# Patient Record
Sex: FEMALE | Race: BLACK OR AFRICAN AMERICAN | NOT HISPANIC OR LATINO
[De-identification: names, ages, dates, MRNs, and addresses within clinical notes are randomized per-mention and may not be internally consistent; named-entity substitution may affect disease eponyms.]

---

## 2018-09-24 ENCOUNTER — TRANSCRIPTION ENCOUNTER (OUTPATIENT)
Age: 33
End: 2018-09-24

## 2022-03-31 ENCOUNTER — NON-APPOINTMENT (OUTPATIENT)
Age: 37
End: 2022-03-31

## 2022-03-31 DIAGNOSIS — Z80.9 FAMILY HISTORY OF MALIGNANT NEOPLASM, UNSPECIFIED: ICD-10-CM

## 2022-03-31 DIAGNOSIS — Z98.890 OTHER SPECIFIED POSTPROCEDURAL STATES: ICD-10-CM

## 2022-03-31 DIAGNOSIS — Z83.3 FAMILY HISTORY OF DIABETES MELLITUS: ICD-10-CM

## 2022-03-31 DIAGNOSIS — Z87.59 PERSONAL HISTORY OF OTHER COMPLICATIONS OF PREGNANCY, CHILDBIRTH AND THE PUERPERIUM: ICD-10-CM

## 2022-03-31 DIAGNOSIS — Z78.9 OTHER SPECIFIED HEALTH STATUS: ICD-10-CM

## 2022-03-31 RX ORDER — VITAMIN A ACETATE, ASCORBIC ACID, CHOLECALCIFEROL, ALPHA-TOCOPHEROL ACETATE, DL, THIAMINE MONONITRATE, RIBOFLAVIN, NIACINAMIDE, PYRIDOXINE HYDROCHLORIDE, FOLIC ACID, CYANOCOBALAMIN, BIOTIN, CALCIUM PANTOTHENATE, CALCIUM CARBONATE, FERROUS FUMARATE, POTASSIUM IODIDE, MAGNESIUM OXIDE, ZINC OXIDE AND CUPRIC OXIDE 2500; 80; 400; 10; 3; 3.4; 20; 20; 1; 12; 300; 6; 120; 27; 150; 30; 15; 2 [IU]/1; MG/1; [IU]/1; [IU]/1; MG/1; MG/1; MG/1; MG/1; MG/1; UG/1; UG/1; MG/1; MG/1; MG/1; UG/1; UG/1; UG/1; MG/1
TABLET, FILM COATED ORAL
Refills: 0 | Status: ACTIVE | COMMUNITY

## 2022-04-05 ENCOUNTER — APPOINTMENT (OUTPATIENT)
Dept: ANTEPARTUM | Facility: CLINIC | Age: 37
End: 2022-04-05
Payer: COMMERCIAL

## 2022-04-05 ENCOUNTER — ASOB RESULT (OUTPATIENT)
Age: 37
End: 2022-04-05

## 2022-04-05 ENCOUNTER — NON-APPOINTMENT (OUTPATIENT)
Age: 37
End: 2022-04-05

## 2022-04-05 PROCEDURE — 76815 OB US LIMITED FETUS(S): CPT

## 2022-04-14 ENCOUNTER — APPOINTMENT (OUTPATIENT)
Dept: OBGYN | Facility: CLINIC | Age: 37
End: 2022-04-14
Payer: COMMERCIAL

## 2022-04-14 VITALS
DIASTOLIC BLOOD PRESSURE: 75 MMHG | BODY MASS INDEX: 30.12 KG/M2 | WEIGHT: 170 LBS | HEART RATE: 100 BPM | SYSTOLIC BLOOD PRESSURE: 127 MMHG | HEIGHT: 63 IN

## 2022-04-14 LAB
BILIRUB UR QL STRIP: NORMAL
GLUCOSE UR-MCNC: NORMAL
HCG UR QL: 0.2 EU/DL
HGB UR QL STRIP.AUTO: NORMAL
KETONES UR-MCNC: NORMAL
LEUKOCYTE ESTERASE UR QL STRIP: NORMAL
NITRITE UR QL STRIP: NORMAL
PH UR STRIP: 6
PROT UR STRIP-MCNC: NORMAL
SP GR UR STRIP: 1

## 2022-04-14 PROCEDURE — 0502F SUBSEQUENT PRENATAL CARE: CPT

## 2022-04-19 ENCOUNTER — APPOINTMENT (OUTPATIENT)
Dept: OBGYN | Facility: CLINIC | Age: 37
End: 2022-04-19

## 2022-05-06 ENCOUNTER — ASOB RESULT (OUTPATIENT)
Age: 37
End: 2022-05-06

## 2022-05-06 ENCOUNTER — APPOINTMENT (OUTPATIENT)
Dept: ANTEPARTUM | Facility: CLINIC | Age: 37
End: 2022-05-06
Payer: COMMERCIAL

## 2022-05-06 PROCEDURE — 76811 OB US DETAILED SNGL FETUS: CPT

## 2022-05-06 PROCEDURE — 76817 TRANSVAGINAL US OBSTETRIC: CPT

## 2022-05-11 ENCOUNTER — APPOINTMENT (OUTPATIENT)
Dept: OBGYN | Facility: CLINIC | Age: 37
End: 2022-05-11
Payer: COMMERCIAL

## 2022-05-11 PROCEDURE — 0502F SUBSEQUENT PRENATAL CARE: CPT

## 2022-05-12 LAB
BILIRUB UR QL STRIP: NORMAL
CLARITY UR: NORMAL
COLLECTION METHOD: NORMAL
GLUCOSE UR-MCNC: NORMAL
HCG UR QL: 0.2 EU/DL
HGB UR QL STRIP.AUTO: NORMAL
KETONES UR-MCNC: NORMAL
LEUKOCYTE ESTERASE UR QL STRIP: NORMAL
NITRITE UR QL STRIP: NORMAL
PH UR STRIP: 5.5
PROT UR STRIP-MCNC: NORMAL
SP GR UR STRIP: 1.01

## 2022-05-13 ENCOUNTER — APPOINTMENT (OUTPATIENT)
Dept: ANTEPARTUM | Facility: CLINIC | Age: 37
End: 2022-05-13
Payer: COMMERCIAL

## 2022-05-13 ENCOUNTER — ASOB RESULT (OUTPATIENT)
Age: 37
End: 2022-05-13

## 2022-05-13 ENCOUNTER — TRANSCRIPTION ENCOUNTER (OUTPATIENT)
Age: 37
End: 2022-05-13

## 2022-05-13 ENCOUNTER — APPOINTMENT (OUTPATIENT)
Dept: MATERNAL FETAL MEDICINE | Facility: CLINIC | Age: 37
End: 2022-05-13
Payer: COMMERCIAL

## 2022-05-13 PROCEDURE — 36415 COLL VENOUS BLD VENIPUNCTURE: CPT

## 2022-05-13 PROCEDURE — 99202 OFFICE O/P NEW SF 15 MIN: CPT | Mod: 95

## 2022-05-17 LAB
2ND TRIMESTER DATA: NORMAL
AFP PNL SERPL: NORMAL
AFP SERPL-ACNC: NORMAL
B-HCG FREE SERPL-MCNC: NORMAL
CLINICAL BIOCHEMIST REVIEW: NORMAL
INHIBIN A SERPL-MCNC: NORMAL
NOTES NTD: NORMAL
U ESTRIOL SERPL-SCNC: NORMAL

## 2022-05-18 ENCOUNTER — NON-APPOINTMENT (OUTPATIENT)
Age: 37
End: 2022-05-18

## 2022-05-19 ENCOUNTER — NON-APPOINTMENT (OUTPATIENT)
Age: 37
End: 2022-05-19

## 2022-06-07 ENCOUNTER — NON-APPOINTMENT (OUTPATIENT)
Age: 37
End: 2022-06-07

## 2022-06-07 ENCOUNTER — APPOINTMENT (OUTPATIENT)
Dept: OBGYN | Facility: CLINIC | Age: 37
End: 2022-06-07
Payer: COMMERCIAL

## 2022-06-07 VITALS
HEART RATE: 74 BPM | DIASTOLIC BLOOD PRESSURE: 67 MMHG | WEIGHT: 180 LBS | BODY MASS INDEX: 31.89 KG/M2 | HEIGHT: 63 IN | SYSTOLIC BLOOD PRESSURE: 115 MMHG

## 2022-06-07 VITALS
BODY MASS INDEX: 31.89 KG/M2 | SYSTOLIC BLOOD PRESSURE: 115 MMHG | DIASTOLIC BLOOD PRESSURE: 67 MMHG | WEIGHT: 180 LBS | HEART RATE: 74 BPM

## 2022-06-07 DIAGNOSIS — Z00.00 ENCOUNTER FOR GENERAL ADULT MEDICAL EXAMINATION W/OUT ABNORMAL FINDINGS: ICD-10-CM

## 2022-06-07 LAB
BILIRUB UR QL STRIP: NORMAL
GLUCOSE UR-MCNC: NORMAL
HCG UR QL: 0.2 EU/DL
HGB UR QL STRIP.AUTO: NORMAL
KETONES UR-MCNC: NORMAL
LEUKOCYTE ESTERASE UR QL STRIP: NORMAL
NITRITE UR QL STRIP: NORMAL
PH UR STRIP: 5.5
PROT UR STRIP-MCNC: NORMAL
SP GR UR STRIP: 1

## 2022-06-07 PROCEDURE — 0502F SUBSEQUENT PRENATAL CARE: CPT

## 2022-06-15 LAB
BASOPHILS # BLD AUTO: 0.01 K/UL
BASOPHILS NFR BLD AUTO: 0.1 %
EOSINOPHIL # BLD AUTO: 0 K/UL
EOSINOPHIL NFR BLD AUTO: 0 %
GLUCOSE 1H P 50 G GLC PO SERPL-MCNC: 122 MG/DL
HCT VFR BLD CALC: 34.6 %
HGB BLD-MCNC: 10.9 G/DL
IMM GRANULOCYTES NFR BLD AUTO: 0.7 %
LYMPHOCYTES # BLD AUTO: 1.24 K/UL
LYMPHOCYTES NFR BLD AUTO: 18.1 %
MAN DIFF?: NORMAL
MCHC RBC-ENTMCNC: 29 PG
MCHC RBC-ENTMCNC: 31.5 GM/DL
MCV RBC AUTO: 92 FL
MONOCYTES # BLD AUTO: 0.29 K/UL
MONOCYTES NFR BLD AUTO: 4.2 %
NEUTROPHILS # BLD AUTO: 5.25 K/UL
NEUTROPHILS NFR BLD AUTO: 76.9 %
PLATELET # BLD AUTO: 168 K/UL
RBC # BLD: 3.76 M/UL
RBC # FLD: 12.9 %
WBC # FLD AUTO: 6.84 K/UL

## 2022-06-16 ENCOUNTER — NON-APPOINTMENT (OUTPATIENT)
Age: 37
End: 2022-06-16

## 2022-06-28 ENCOUNTER — APPOINTMENT (OUTPATIENT)
Dept: OBGYN | Facility: CLINIC | Age: 37
End: 2022-06-28
Payer: COMMERCIAL

## 2022-06-28 VITALS
SYSTOLIC BLOOD PRESSURE: 118 MMHG | BODY MASS INDEX: 32.77 KG/M2 | DIASTOLIC BLOOD PRESSURE: 72 MMHG | WEIGHT: 185 LBS | HEART RATE: 94 BPM

## 2022-06-28 LAB
BILIRUB UR QL STRIP: NORMAL
GLUCOSE UR-MCNC: NORMAL
HCG UR QL: 0.2 EU/DL
HGB UR QL STRIP.AUTO: NORMAL
KETONES UR-MCNC: NORMAL
LEUKOCYTE ESTERASE UR QL STRIP: NORMAL
NITRITE UR QL STRIP: NORMAL
PH UR STRIP: 7
PROT UR STRIP-MCNC: NORMAL
SP GR UR STRIP: 1.01

## 2022-06-28 PROCEDURE — 0502F SUBSEQUENT PRENATAL CARE: CPT

## 2022-06-30 ENCOUNTER — APPOINTMENT (OUTPATIENT)
Dept: ANTEPARTUM | Facility: CLINIC | Age: 37
End: 2022-06-30

## 2022-06-30 ENCOUNTER — ASOB RESULT (OUTPATIENT)
Age: 37
End: 2022-06-30

## 2022-06-30 PROCEDURE — 76816 OB US FOLLOW-UP PER FETUS: CPT

## 2022-07-15 ENCOUNTER — NON-APPOINTMENT (OUTPATIENT)
Age: 37
End: 2022-07-15

## 2022-07-19 ENCOUNTER — APPOINTMENT (OUTPATIENT)
Dept: OBGYN | Facility: CLINIC | Age: 37
End: 2022-07-19

## 2022-07-19 VITALS
DIASTOLIC BLOOD PRESSURE: 70 MMHG | WEIGHT: 187 LBS | SYSTOLIC BLOOD PRESSURE: 118 MMHG | BODY MASS INDEX: 33.13 KG/M2 | HEART RATE: 83 BPM

## 2022-07-19 PROCEDURE — 0502F SUBSEQUENT PRENATAL CARE: CPT

## 2022-07-20 LAB
APPEARANCE: CLEAR
BILIRUBIN URINE: NEGATIVE
BLOOD URINE: NEGATIVE
COLOR: COLORLESS
GLUCOSE QUALITATIVE U: NEGATIVE
KETONES URINE: NEGATIVE
LEUKOCYTE ESTERASE URINE: NEGATIVE
NITRITE URINE: NEGATIVE
PH URINE: 7
PROTEIN URINE: NEGATIVE
SPECIFIC GRAVITY URINE: 1.01
UROBILINOGEN URINE: NORMAL

## 2022-07-21 LAB — BACTERIA UR CULT: NORMAL

## 2022-07-28 ENCOUNTER — ASOB RESULT (OUTPATIENT)
Age: 37
End: 2022-07-28

## 2022-07-28 ENCOUNTER — APPOINTMENT (OUTPATIENT)
Dept: ANTEPARTUM | Facility: CLINIC | Age: 37
End: 2022-07-28

## 2022-07-28 PROCEDURE — 76816 OB US FOLLOW-UP PER FETUS: CPT

## 2022-07-28 PROCEDURE — 76820 UMBILICAL ARTERY ECHO: CPT

## 2022-07-28 PROCEDURE — ZZZZZ: CPT

## 2022-07-28 PROCEDURE — 76818 FETAL BIOPHYS PROFILE W/NST: CPT

## 2022-08-02 ENCOUNTER — APPOINTMENT (OUTPATIENT)
Dept: OBGYN | Facility: CLINIC | Age: 37
End: 2022-08-02

## 2022-08-02 VITALS
WEIGHT: 191 LBS | DIASTOLIC BLOOD PRESSURE: 71 MMHG | SYSTOLIC BLOOD PRESSURE: 118 MMHG | HEART RATE: 92 BPM | BODY MASS INDEX: 33.83 KG/M2

## 2022-08-02 PROCEDURE — 0502F SUBSEQUENT PRENATAL CARE: CPT

## 2022-08-02 PROCEDURE — 59426 ANTEPARTUM CARE ONLY: CPT

## 2022-08-08 ENCOUNTER — TRANSCRIPTION ENCOUNTER (OUTPATIENT)
Age: 37
End: 2022-08-08

## 2022-08-09 ENCOUNTER — INPATIENT (INPATIENT)
Facility: HOSPITAL | Age: 37
LOS: 3 days | Discharge: HOME | End: 2022-08-13
Attending: OBSTETRICS & GYNECOLOGY | Admitting: OBSTETRICS & GYNECOLOGY
Payer: COMMERCIAL

## 2022-08-09 ENCOUNTER — NON-APPOINTMENT (OUTPATIENT)
Age: 37
End: 2022-08-09

## 2022-08-09 ENCOUNTER — RESULT REVIEW (OUTPATIENT)
Age: 37
End: 2022-08-09

## 2022-08-09 VITALS
SYSTOLIC BLOOD PRESSURE: 115 MMHG | TEMPERATURE: 99 F | RESPIRATION RATE: 17 BRPM | HEART RATE: 102 BPM | DIASTOLIC BLOOD PRESSURE: 70 MMHG

## 2022-08-09 DIAGNOSIS — O42.919 PRETERM PREMATURE RUPTURE OF MEMBRANES, UNSPECIFIED AS TO LENGTH OF TIME BETWEEN RUPTURE AND ONSET OF LABOR, UNSPECIFIED TRIMESTER: ICD-10-CM

## 2022-08-09 DIAGNOSIS — Z29.9 ENCOUNTER FOR PROPHYLACTIC MEASURES, UNSPECIFIED: ICD-10-CM

## 2022-08-09 DIAGNOSIS — O47.03 FALSE LABOR BEFORE 37 COMPLETED WEEKS OF GESTATION, THIRD TRIMESTER: ICD-10-CM

## 2022-08-09 DIAGNOSIS — O34.219 MATERNAL CARE FOR UNSPECIFIED TYPE SCAR FROM PREVIOUS CESAREAN DELIVERY: ICD-10-CM

## 2022-08-09 DIAGNOSIS — Z3A.34 34 WEEKS GESTATION OF PREGNANCY: ICD-10-CM

## 2022-08-09 DIAGNOSIS — O41.03X1 OLIGOHYDRAMNIOS, THIRD TRIMESTER, FETUS 1: ICD-10-CM

## 2022-08-09 LAB
ABO RH CONFIRMATION: SIGNIFICANT CHANGE UP
BASOPHILS # BLD AUTO: 0.01 K/UL — SIGNIFICANT CHANGE UP (ref 0–0.2)
BASOPHILS NFR BLD AUTO: 0.1 % — SIGNIFICANT CHANGE UP (ref 0–2)
BLD GP AB SCN SERPL QL: SIGNIFICANT CHANGE UP
COVID-19 SPIKE DOMAIN AB INTERP: POSITIVE
COVID-19 SPIKE DOMAIN ANTIBODY RESULT: 161 U/ML — HIGH
EOSINOPHIL # BLD AUTO: 0.01 K/UL — SIGNIFICANT CHANGE UP (ref 0–0.5)
EOSINOPHIL NFR BLD AUTO: 0.1 % — SIGNIFICANT CHANGE UP (ref 0–6)
HCT VFR BLD CALC: 33.4 % — LOW (ref 34.5–45)
HGB BLD-MCNC: 11.2 G/DL — LOW (ref 11.5–15.5)
HIV 1 & 2 AB SERPL IA.RAPID: SIGNIFICANT CHANGE UP
HIV 1+2 AB+HIV1 P24 AG SERPL QL IA: SIGNIFICANT CHANGE UP
IMM GRANULOCYTES NFR BLD AUTO: 1 % — SIGNIFICANT CHANGE UP (ref 0–1.5)
LYMPHOCYTES # BLD AUTO: 1.22 K/UL — SIGNIFICANT CHANGE UP (ref 1–3.3)
LYMPHOCYTES # BLD AUTO: 17 % — SIGNIFICANT CHANGE UP (ref 13–44)
MCHC RBC-ENTMCNC: 28.6 PG — SIGNIFICANT CHANGE UP (ref 27–34)
MCHC RBC-ENTMCNC: 33.5 GM/DL — SIGNIFICANT CHANGE UP (ref 32–36)
MCV RBC AUTO: 85.2 FL — SIGNIFICANT CHANGE UP (ref 80–100)
MONOCYTES # BLD AUTO: 0.55 K/UL — SIGNIFICANT CHANGE UP (ref 0–0.9)
MONOCYTES NFR BLD AUTO: 7.7 % — SIGNIFICANT CHANGE UP (ref 2–14)
NEUTROPHILS # BLD AUTO: 5.31 K/UL — SIGNIFICANT CHANGE UP (ref 1.8–7.4)
NEUTROPHILS NFR BLD AUTO: 74.1 % — SIGNIFICANT CHANGE UP (ref 43–77)
PLATELET # BLD AUTO: 148 K/UL — LOW (ref 150–400)
RBC # BLD: 3.92 M/UL — SIGNIFICANT CHANGE UP (ref 3.8–5.2)
RBC # FLD: 13 % — SIGNIFICANT CHANGE UP (ref 10.3–14.5)
SARS-COV-2 IGG+IGM SERPL QL IA: 161 U/ML — HIGH
SARS-COV-2 IGG+IGM SERPL QL IA: POSITIVE
SARS-COV-2 RNA SPEC QL NAA+PROBE: SIGNIFICANT CHANGE UP
T PALLIDUM AB TITR SER: NEGATIVE — SIGNIFICANT CHANGE UP
WBC # BLD: 7.17 K/UL — SIGNIFICANT CHANGE UP (ref 3.8–10.5)
WBC # FLD AUTO: 7.17 K/UL — SIGNIFICANT CHANGE UP (ref 3.8–10.5)

## 2022-08-09 PROCEDURE — 99221 1ST HOSP IP/OBS SF/LOW 40: CPT

## 2022-08-09 PROCEDURE — 88307 TISSUE EXAM BY PATHOLOGIST: CPT | Mod: 26

## 2022-08-09 PROCEDURE — 59025 FETAL NON-STRESS TEST: CPT | Mod: 26

## 2022-08-09 PROCEDURE — 99222 1ST HOSP IP/OBS MODERATE 55: CPT

## 2022-08-09 PROCEDURE — 59515 CESAREAN DELIVERY: CPT

## 2022-08-09 PROCEDURE — 13101 CMPLX RPR TRUNK 2.6-7.5 CM: CPT | Mod: 59

## 2022-08-09 PROCEDURE — 59514 CESAREAN DELIVERY ONLY: CPT | Mod: 80,U7,GC

## 2022-08-09 RX ORDER — DIPHENHYDRAMINE HCL 50 MG
25 CAPSULE ORAL EVERY 6 HOURS
Refills: 0 | Status: COMPLETED | OUTPATIENT
Start: 2022-08-09 | End: 2023-07-08

## 2022-08-09 RX ORDER — CEFAZOLIN SODIUM 1 G
2000 VIAL (EA) INJECTION ONCE
Refills: 0 | Status: COMPLETED | OUTPATIENT
Start: 2022-08-09 | End: 2022-08-09

## 2022-08-09 RX ORDER — CITRIC ACID/SODIUM CITRATE 300-500 MG
30 SOLUTION, ORAL ORAL ONCE
Refills: 0 | Status: COMPLETED | OUTPATIENT
Start: 2022-08-09 | End: 2022-08-09

## 2022-08-09 RX ORDER — OXYCODONE HYDROCHLORIDE 5 MG/1
5 TABLET ORAL
Refills: 0 | Status: DISCONTINUED | OUTPATIENT
Start: 2022-08-09 | End: 2022-08-13

## 2022-08-09 RX ORDER — DIPHENHYDRAMINE HCL 50 MG
25 CAPSULE ORAL EVERY 4 HOURS
Refills: 0 | Status: DISCONTINUED | OUTPATIENT
Start: 2022-08-09 | End: 2022-08-10

## 2022-08-09 RX ORDER — AZITHROMYCIN 500 MG/1
500 TABLET, FILM COATED ORAL ONCE
Refills: 0 | Status: COMPLETED | OUTPATIENT
Start: 2022-08-09 | End: 2022-08-09

## 2022-08-09 RX ORDER — OXYTOCIN 10 UNIT/ML
333.33 VIAL (ML) INJECTION
Qty: 20 | Refills: 0 | Status: DISCONTINUED | OUTPATIENT
Start: 2022-08-09 | End: 2022-08-13

## 2022-08-09 RX ORDER — OXYTOCIN 10 UNIT/ML
333.33 VIAL (ML) INJECTION
Qty: 20 | Refills: 0 | Status: COMPLETED | OUTPATIENT
Start: 2022-08-09 | End: 2022-08-09

## 2022-08-09 RX ORDER — MAGNESIUM HYDROXIDE 400 MG/1
30 TABLET, CHEWABLE ORAL
Refills: 0 | Status: DISCONTINUED | OUTPATIENT
Start: 2022-08-09 | End: 2022-08-13

## 2022-08-09 RX ORDER — ONDANSETRON 8 MG/1
4 TABLET, FILM COATED ORAL EVERY 6 HOURS
Refills: 0 | Status: DISCONTINUED | OUTPATIENT
Start: 2022-08-09 | End: 2022-08-13

## 2022-08-09 RX ORDER — DEXAMETHASONE 0.5 MG/5ML
4 ELIXIR ORAL EVERY 6 HOURS
Refills: 0 | Status: DISCONTINUED | OUTPATIENT
Start: 2022-08-09 | End: 2022-08-13

## 2022-08-09 RX ORDER — SODIUM CHLORIDE 9 MG/ML
1000 INJECTION, SOLUTION INTRAVENOUS
Refills: 0 | Status: DISCONTINUED | OUTPATIENT
Start: 2022-08-09 | End: 2022-08-09

## 2022-08-09 RX ORDER — SODIUM CHLORIDE 9 MG/ML
1000 INJECTION, SOLUTION INTRAVENOUS ONCE
Refills: 0 | Status: COMPLETED | OUTPATIENT
Start: 2022-08-09 | End: 2022-08-09

## 2022-08-09 RX ORDER — SIMETHICONE 80 MG/1
80 TABLET, CHEWABLE ORAL EVERY 4 HOURS
Refills: 0 | Status: DISCONTINUED | OUTPATIENT
Start: 2022-08-09 | End: 2022-08-13

## 2022-08-09 RX ORDER — TETANUS TOXOID, REDUCED DIPHTHERIA TOXOID AND ACELLULAR PERTUSSIS VACCINE, ADSORBED 5; 2.5; 8; 8; 2.5 [IU]/.5ML; [IU]/.5ML; UG/.5ML; UG/.5ML; UG/.5ML
0.5 SUSPENSION INTRAMUSCULAR ONCE
Refills: 0 | Status: DISCONTINUED | OUTPATIENT
Start: 2022-08-09 | End: 2022-08-13

## 2022-08-09 RX ORDER — FAMOTIDINE 10 MG/ML
20 INJECTION INTRAVENOUS ONCE
Refills: 0 | Status: COMPLETED | OUTPATIENT
Start: 2022-08-09 | End: 2022-08-09

## 2022-08-09 RX ORDER — SODIUM CHLORIDE 9 MG/ML
1000 INJECTION, SOLUTION INTRAVENOUS
Refills: 0 | Status: DISCONTINUED | OUTPATIENT
Start: 2022-08-09 | End: 2022-08-13

## 2022-08-09 RX ORDER — ACETAMINOPHEN 500 MG
975 TABLET ORAL
Refills: 0 | Status: DISCONTINUED | OUTPATIENT
Start: 2022-08-09 | End: 2022-08-13

## 2022-08-09 RX ORDER — OXYCODONE HYDROCHLORIDE 5 MG/1
5 TABLET ORAL ONCE
Refills: 0 | Status: DISCONTINUED | OUTPATIENT
Start: 2022-08-09 | End: 2022-08-13

## 2022-08-09 RX ORDER — NALOXONE HYDROCHLORIDE 4 MG/.1ML
0.1 SPRAY NASAL
Refills: 0 | Status: DISCONTINUED | OUTPATIENT
Start: 2022-08-09 | End: 2022-08-13

## 2022-08-09 RX ORDER — KETOROLAC TROMETHAMINE 30 MG/ML
30 SYRINGE (ML) INJECTION EVERY 6 HOURS
Refills: 0 | Status: DISCONTINUED | OUTPATIENT
Start: 2022-08-09 | End: 2022-08-10

## 2022-08-09 RX ORDER — LANOLIN
1 OINTMENT (GRAM) TOPICAL EVERY 6 HOURS
Refills: 0 | Status: DISCONTINUED | OUTPATIENT
Start: 2022-08-09 | End: 2022-08-13

## 2022-08-09 RX ORDER — IBUPROFEN 200 MG
600 TABLET ORAL EVERY 6 HOURS
Refills: 0 | Status: COMPLETED | OUTPATIENT
Start: 2022-08-09 | End: 2023-07-08

## 2022-08-09 RX ORDER — ENOXAPARIN SODIUM 100 MG/ML
40 INJECTION SUBCUTANEOUS EVERY 24 HOURS
Refills: 0 | Status: DISCONTINUED | OUTPATIENT
Start: 2022-08-10 | End: 2022-08-13

## 2022-08-09 RX ADMIN — Medication 12 MILLIGRAM(S): at 09:04

## 2022-08-09 RX ADMIN — Medication 100 MILLIGRAM(S): at 17:33

## 2022-08-09 RX ADMIN — Medication 1000 MILLIUNIT(S)/MIN: at 17:55

## 2022-08-09 RX ADMIN — FAMOTIDINE 20 MILLIGRAM(S): 10 INJECTION INTRAVENOUS at 16:44

## 2022-08-09 RX ADMIN — AZITHROMYCIN 255 MILLIGRAM(S): 500 TABLET, FILM COATED ORAL at 17:03

## 2022-08-09 RX ADMIN — SODIUM CHLORIDE 125 MILLILITER(S): 9 INJECTION, SOLUTION INTRAVENOUS at 10:00

## 2022-08-09 RX ADMIN — SODIUM CHLORIDE 2000 MILLILITER(S): 9 INJECTION, SOLUTION INTRAVENOUS at 16:30

## 2022-08-09 RX ADMIN — Medication 30 MILLILITER(S): at 16:44

## 2022-08-09 RX ADMIN — Medication 30 MILLIGRAM(S): at 20:54

## 2022-08-09 NOTE — OB RN DELIVERY SUMMARY - AS DELIV COMPLICATIONS OB
premature rupture of membranes prior to labor nuchal cord/premature rupture of membranes prior to labor

## 2022-08-09 NOTE — OB PROVIDER DELIVERY SUMMARY - NSPROVIDERDELIVERYNOTE_OBGYN_ALL_OB_FT
Brief  Delivery Summary    Procedure: Repeat  Delivery at 34w2d   Findings: Viable male infant delivered in cephalic presentation at 07:53, placenta delivered at 07:55.  Apgar scores 9/9  Weight: 2170  QBL: 244  UOP: 150  Complications: Nuchal cord x1 Brief  Delivery Summary    Procedure: Repeat  Delivery at 34w2d   Findings: Viable male infant delivered in cephalic presentation at 17:53, placenta delivered at 17:55.  Apgar scores 9/9  Weight: 2170  QBL: 244  UOP: 150  Complications: Nuchal cord x1

## 2022-08-09 NOTE — OB RN DELIVERY SUMMARY - NS_SEPSISRSKCALC_OBGYN_ALL_OB_FT
EOS calculated successfully. EOS Risk Factor: 0.5/1000 live births ([facility] national incidence); GA=34w2d; Temp=99; ROM=11.883; GBS='Unknown'; Antibiotics='No antibiotics or any antibiotics < 2 hrs prior to birth'

## 2022-08-09 NOTE — OB PROVIDER DELIVERY SUMMARY - NSATTEMPTEDVBAC_OBGYN_ALL_OB
LOV: 12/18/20  NOV: 3/18/21  Last refill: 1/15/21      Per PDMP last dispensed on 1/15/21 for 30 day supply    No

## 2022-08-09 NOTE — CONSULT NOTE PEDS - SUBJECTIVE AND OBJECTIVE BOX
This is a 37year old   EDC 22 at 34-1/7 weeks gestation who presents to L&D for LOF since this morning. Patient denied vaginal bleeding and contractions. She endorsed good fetal movement. Denied fevers, chills, nausea, vomiting, chest pain, SOB, dizziness and headache. No other complaints at this time.     CAROLYN: 2022  LMP: 2021    Prenatal course is significant for:  AMA      POB:  G1: , FT , uncomplicated  G2: : FT pCS 2/2 NRFHT in the setting of nuchal cord  G3: : FT rCS, uncomplicated  G4: 2015: sAB  PGYN: -fibroids, -ovarian cysts, denies STD hx, denies abnormal PAPs   PMH: Denies  PSH: CSecx2  SH: Denies EtOH, tobacco and illicit drug use during this pregnancy; feels safe at home   Meds: PNVs  Allergies: NKDA    Sono: cephalic, anterior placenta ()  EFW: 1817g ()        Vital Signs:  Vital Signs Last 24 Hrs  T(C): 37.2 (09 Aug 2022 08:47), Max: 37.2 (09 Aug 2022 08:05)  T(F): 99 (09 Aug 2022 08:47), Max: 99 (09 Aug 2022 08:47)  HR: 96 (09 Aug 2022 09:13) (96 - 102)  BP: 110/74 (09 Aug 2022 09:13) (110/74 - 115/70)  RR: 17 (09 Aug 2022 08:47) (17 - 17)    Height (cm): 160 (22 @ 08:47)  Weight (kg): 86.2 (22 @ 08:47)  BMI (kg/m2): 33.7 (22 @ 08:47)  BSA (m2): 1.89 (22 @ 08:47)    FHT: baseline FHR 150s, moderate variability, +accels, -decels  [place]: no contractions    Bedside sono: cephalic presentation, anterior placenta, anhydramnios    Labs:                          11.2   7.17  )-----------( 148      ( 09 Aug 2022 09:39 )             33.4     MEDICATIONS  (STANDING):  azithromycin  IVPB 500 milliGRAM(s) IV Intermittent once  ceFAZolin   IVPB 2000 milliGRAM(s) IV Intermittent once  citric acid/sodium citrate Solution 30 milliLiter(s) Oral once  famotidine Injectable 20 milliGRAM(s) IV Push once  lactated ringers Bolus 1000 milliLiter(s) IV Bolus once  lactated ringers. 1000 milliLiter(s) (125 mL/Hr) IV Continuous <Continuous>  oxytocin Infusion 333.333 milliUNIT(s)/Min (1000 mL/Hr) IV Continuous <Continuous>  s/p betamethasone x1 on 22

## 2022-08-09 NOTE — OB PROVIDER LABOR PROGRESS NOTE - ASSESSMENT
Patient with PROM this AM  On admission  NO contractions  Now Q 3-4 ---  getting uncomfortable  First dose of lung maturation Tx , infused  will proceed to c/s delivery  Anesthesia has seen the patient 
Patient seen and tracing reviewed , and consent obtained at ~~~09:30  Cx  NOT examined  Patient with evidence of PROM and oligo on sono  Patient receiving fetal lung maturation tx  MFM consulted  delivery this evening or prior if indicated 
intermittent

## 2022-08-09 NOTE — CONSULT NOTE ADULT - PROBLEM SELECTOR RECOMMENDATION 2
- PPROM evident by gross pooling and + Nitrazine swab  - Anhydramnios on sonogram  - As patient is >34 weeks, recommend not delaying delivery  - Will give one dose of betamethasone this morning, however do not recommend delaying delivery for the second dose in setting of PPROM with anhydramnios

## 2022-08-09 NOTE — OB NEONATOLOGY/PEDIATRICIAN DELIVERY SUMMARY - NSPEDSNEONOTESA_OBGYN_ALL_OB_FT
34.2wk GA male infant born by c/s to 38yo  who presented with PPROM.   Prenatal labs:Hep B, HIV, RPR neg, GBS unk- no IAP given. Maternal blood type O+. No significant maternal medical history.   Infant received beta x1 prior to delivery.   SROM was 13hrs prior to delivery.  Infant born with good cry and tone, delayed cord clamping done [id number], brought to radiant warmer, dried and bulb suctioned. Comfortable on room air.   EOS 1.06

## 2022-08-09 NOTE — OB RN PATIENT PROFILE - FUNCTIONAL ASSESSMENT - DAILY ACTIVITY PT AGE POP HIDDEN
Reason For Call:   No chief complaint on file.      Medication Name, Dose and Monthly Quantity:   Oxycodone with APAP (Percocet): Dose 5-325 Schedule 1 tablet by mouth every 6 hours prn Monthly Quantity 124   Diagnosis requiring opiates:   Acute midline low back pain without sciatica [M54.5]  - Primary     Problem List Updated:   No    Opioid Agreement On File - [facility] PAIN CONTRACT ID# [id number]:  No    Last Urine Drug Screen (at least once every 12 months) Date:   n/a  Unexpected Results:   n/a    [place]  Data Reviewed (at least once every 3 months) Date:   11/03/2021      Unexpected Results:    No.    Last Fill Date:   10/15/2021  Due Date:   11/13/2021  Last Visit with PCP:   12/17/2020    Future Visits with PCP:   No.    Processing:   MARIA [facility] candy Perez RN      ----------------------------------      
Adult

## 2022-08-09 NOTE — OB RN INTRAOPERATIVE NOTE - NSSELHIDDEN_OBGYN_ALL_OB_FT
[NS_DeliveryAttending1_OBGYN_ALL_OB_FT:[id number]],[NS_DeliveryAttending2_OBGYN_ALL_OB_FT:[id number]],[NS_DeliveryRN_OBGYN_ALL_OB_FT:[id number]=]

## 2022-08-09 NOTE — OB RN PATIENT PROFILE - FALL HARM RISK - UNIVERSAL INTERVENTIONS
Bed in lowest position, wheels locked, appropriate side rails in place/Call bell, personal items and telephone in reach/Instruct patient to call for assistance before getting out of bed or chair/Non-slip footwear when patient is out of bed/[place] to call system/Physically safe environment - no spills, clutter or unnecessary equipment/Purposeful Proactive Rounding/Room/bathroom lighting operational, light cord in reach

## 2022-08-09 NOTE — OB RN DELIVERY SUMMARY - NSSELHIDDEN_OBGYN_ALL_OB_FT
[NS_DeliveryAttending1_OBGYN_ALL_OB_FT:[id number]],[NS_DeliveryAttending2_OBGYN_ALL_OB_FT:[id number]],[NS_DeliveryRN_OBGYN_ALL_OB_FT:[id number]] [NS_DeliveryAttending1_OBGYN_ALL_OB_FT:[id number]],[NS_DeliveryAttending2_OBGYN_ALL_OB_FT:[id number]],[NS_DeliveryRN_OBGYN_ALL_OB_FT:[id number]=],[NS_DeliveryAssist1_OBGYN_ALL_OB_FT:[id number]=]

## 2022-08-09 NOTE — CONSULT NOTE ADULT - PROBLEM SELECTOR RECOMMENDATION 5
- Will receive 1 dose of betamethasone for fetal lung maturity  - Will receive pre-operative prophylactic antibiotics  - Lovenox post-operatively  - Continuous uterine and fetal monitoring

## 2022-08-09 NOTE — CONSULT NOTE PEDS - PROBLEM/RECOMMENDATION-1
Problem: Pain:  Goal: Pain level will decrease  Description: Pain level will decrease  Outcome: Ongoing  Goal: Control of acute pain  Description: Control of acute pain  Outcome: Ongoing  Goal: Control of chronic pain  Description: Control of chronic pain  Outcome: Ongoing     Problem: Falls - Risk of:  Goal: Will remain free from falls  Description: Will remain free from falls  Outcome: Ongoing  Goal: Absence of physical injury  Description: Absence of physical injury  Outcome: Ongoing DISPLAY PLAN FREE TEXT

## 2022-08-09 NOTE — CONSULT NOTE ADULT - ATTENDING COMMENTS
MFM - Consultation requested for this 37y  at 34w1d admitted with PPROM.  FHRT Cat I without regular contractions noted on ultrasound.  Patient denies abdominal pain or vaginal bleeding and confirms fetal movement.  Afebrile without clinical evidence of infection.  Bedside ultrasound with fetus in vertex presentation, no measurable MAXI, BPP 6/8 (-2 MAXI).  We reviewed the diagnosis of PPROM and options for pregnancy management.  Given gestational age >34 weeks, we reviewed risks and benefits of immediate delivery versus delaying delivery until after later term corticosteroid administration.  Given her current gestational age and the finding of anhydramnios, recommend proceeding with delivery given increased risk to patient and fetus when compared to benefit of delivery.  Patient to receive dose of betamethasone now while waiting for repeat  delivery; however, would not delay routine obstetrical care for administration of late  steroids in the setting of PPROM, prior CD, and anhydramnios.  Proceed with urgent delivery if bleeding, labor, or change in fetal heart rate tracing.  Patient and partner voiced understanding of the above counseling and are in agreement. All questions answered.  L&D, anesthesia, and NICU staff aware.   Raissa Mendoza MD  Maternal Fetal Medicine

## 2022-08-09 NOTE — OB PROVIDER H&P - NSHPPHYSICALEXAM_GEN_ALL_CORE
T(C): 37.2 (08-09-22 @ 08:47), Max: 37.2 (08-09-22 @ 08:05)  HR: 102 (08-09-22 @ 08:47) (102 - 102)  BP: 115/70 (08-09-22 @ 08:47) (115/70 - 115/70)  RR: 17 (08-09-22 @ 08:47) (17 - 17)    Gen: NAD, well-appearing, AAOx3   Abd: Soft, gravid  Ext: non-tender, non-edematous  SSE: closed cervix, gross pooling noted  Bedside sono: vertex, anterior placenta,   FHT: baseline FHR 150s, moderate variability, +accels, -decels  [place]: no contractions

## 2022-08-09 NOTE — OB PROVIDER H&P - HISTORY OF PRESENT ILLNESS
37y  at 34w1d GA by LMP who presents to L&D for LOF since this morning. Patient denies vaginal bleeding and contractions. She endorses good fetal movement. Denies fevers, chills, nausea, vomiting, chest pain, SOB, dizziness and headache. No other complaints at this time.     CAROLYN: 2022  LMP: 2021    Prenatal course is significant for:  AMA      POB:  G1: , FT , uncomplicated  G2: 2013: FT pCS 2/2 NRFHT in the setting of nuchal cord  G3: 2014: FT rCS, uncomplicated  G4: 2015: sAB  PGYN: -fibroids, -ovarian cysts, denies STD hx, denies abnormal PAPs   PMH: Denies  PSH: CSecx2  SH: Denies EtOH, tobacco and illicit drug use during this pregnancy; feels safe at home   Meds: PNVs  Allergies: NKDA    Sono: vertex, posterior placenta ()  EFW: 1817g ()

## 2022-08-09 NOTE — CONSULT NOTE PEDS - ASSESSMENT
Asked by OB/MFM teams to discuss the necessary NICU care for infatnts born at 34 weeks gestation. Asked by OB/MFM teams to discuss the necessary NICU care for infants born at 34 weeks gestation as the plan is to deliver this patient later today.    We discussed typical morbidities of late prematurity including respiratory distress, hypothermia, hypoglycemia, and poor feeding. We discussed need for NICU hospitalization to assess for and manage these issues, and NICU visitation policy for family. We discussed typical NICU hospitalization of 1-2 weeks.  We discussed pumping as mother would like to breast feed the infant.  We discussed NICU attendance at delivery.    All questions posed by mother were answered.  She expressed good understanding of the care plan.    Please reconsult if additional questions arise, or for delivery.    Approximately 55 minutes spent on consultation including discussion with primary team, discussion with family, and review of medical record.

## 2022-08-09 NOTE — OB RN DELIVERY SUMMARY - NS_FETALMONITOR_OBGYN_ALL_OB
Pharmacy Note    Izabela Buckley was ordered [employer]. As per the [address] [place] [address], herbals and certain dietary supplements will be discontinued.   The herbal or dietary supplement may be continued after discharge from the hospital. External [place]/External FHR

## 2022-08-09 NOTE — OB PROVIDER H&P - ASSESSMENT
37y  at 34w1d GA by LMP who presents to L&D with PPROM. Admit to L&D for rCS.    A/P:   -Admit to L&D  -Consent  -Admission labs  -NPO  -IV fluids   -Fetus: Cat I tracing. Continuous toco and fetal monitoring.   -GBS: Unknown  -Betamethasone ordered  -Ancef 2g and Azithromycin 500mg for pre-op antibiotics  -Analgesia: Spinal    Discussed with Dr. Prince

## 2022-08-09 NOTE — CONSULT NOTE ADULT - PROBLEM SELECTOR RECOMMENDATION 9
- Dated by LMP consistent with 2nd trimester sonogram  - NST reactive  - Will give one dose BMZ for ACS  - As patient >32 weeks gestation, no need for magnesium sulfate for neuroprotection

## 2022-08-09 NOTE — OB PROVIDER DELIVERY SUMMARY - NSSELHIDDEN_OBGYN_ALL_OB_FT
[NS_DeliveryAttending1_OBGYN_ALL_OB_FT:[id number]],[NS_DeliveryAttending2_OBGYN_ALL_OB_FT:[id number]],[NS_DeliveryRN_OBGYN_ALL_OB_FT:[id number]=],[NS_DeliveryAssist1_OBGYN_ALL_OB_FT:[id number]=]

## 2022-08-09 NOTE — CONSULT NOTE ADULT - PROBLEM SELECTOR RECOMMENDATION 3
- PPROM with associated anhydramnios on sonogram today  - Continuous fetal and uterine monitoring  - Will give one dose BMZ for ACS and plan for delivery 12 hours later. In setting of anhydramnios, we do not recommend delaying delivery to complete the full steroid course.  - Risks and benefits of delivery today versus continued pregnancy discussed with patient. Risks of delayed delivery include increased risk of maternal infection, increased risk of hemorrhage, increased risk of emergency delivery for fetal heart rate concerns reviewed.   - Neonatologist consult called to discuss fetal risks of  delivery

## 2022-08-09 NOTE — CONSULT NOTE ADULT - PROBLEM SELECTOR RECOMMENDATION 4
- Patient has h/o two prior  sections  - She is not dilated on speculum exam and is not meli  - Plan for repeat  delivery 12 hours after dose of betamethasone  - If patient goes into labor or has fetal heart rate abnormalities, will proceed with repeat  delivery sooner  - Will receive 2g Ancef and 500mg Azithromycin for antibiotic ppx prior to surgery

## 2022-08-09 NOTE — CONSULT NOTE ADULT - SUBJECTIVE AND OBJECTIVE BOX
HYACINTH SMALL  [facility] DELV 2099 10  A 37year old   EDC 22 at 34 1/7 weeks gestation who presents to L&D for LOF since this morning. Patient denies vaginal bleeding and contractions. She endorses good fetal movement. Denies fevers, chills, nausea, vomiting, chest pain, SOB, dizziness and headache. No other complaints at this time.     CAROLYN: 2022  LMP: 2021    Prenatal course is significant for:  AMA      POB:  G1: , FT , uncomplicated  G2: 2013: FT pCS / NRFHT in the setting of nuchal cord  G3: 2014: FT rCS, uncomplicated  G4: 2015: sAB  PGYN: -fibroids, -ovarian cysts, denies STD hx, denies abnormal PAPs   PMH: Denies  PSH: CSecx2  SH: Denies EtOH, tobacco and illicit drug use during this pregnancy; feels safe at home   Meds: PNVs  Allergies: NKDA    Sono: cephalic, anterior placenta ()  EFW: 1817g ()        Vital Signs:  Vital Signs Last 24 Hrs  T(C): 37.2 (09 Aug 2022 08:47), Max: 37.2 (09 Aug 2022 08:05)  T(F): 99 (09 Aug 2022 08:47), Max: 99 (09 Aug 2022 08:47)  HR: 96 (09 Aug 2022 09:13) (96 - 102)  BP: 110/74 (09 Aug 2022 09:13) (110/74 - 115/70)  RR: 17 (09 Aug 2022 08:47) (17 - 17)    Parameters below as of 09 Aug 2022 08:47  Patient On (Oxygen Delivery Method): room air      Height (cm): 160 (22 @ 08:47)  Weight (kg): 86.2 (22 @ 08:47)  BMI (kg/m2): 33.7 (22 @ 08:47)  BSA (m2): 1.89 (22 @ 08:47)    Physical Exam:  General: Adult female in NAD  Head/Neck: No neck masses, no lymphadenopathy  CVS: RRR, +S1/S2, no murmurs  Lungs: CTAB, no wheezing, rhonchi or rales  Abdomen: soft, non-tender, gravid uterus  SSE: closed cervix, gross pooling of small amount of clear fluid noted  Ext: No cyanosis, edema or calf tenderness  Skin: No rashes or lesions on exposed skin  Neuro: Normal DTRs, grossly intact    FHT: baseline FHR 150s, moderate variability, +accels, -decels  [place]: no contractions    Bedside sono: cephalic presentation, anterior placenta, anhydramnios    Labs:                          11.2   7.17  )-----------( 148      ( 09 Aug 2022 09:39 )             33.4                   Radiology:    MEDICATIONS  (STANDING):  azithromycin  IVPB 500 milliGRAM(s) IV Intermittent once  ceFAZolin   IVPB 2000 milliGRAM(s) IV Intermittent once  citric acid/sodium citrate Solution 30 milliLiter(s) Oral once  famotidine Injectable 20 milliGRAM(s) IV Push once  lactated ringers Bolus 1000 milliLiter(s) IV Bolus once  lactated ringers. 1000 milliLiter(s) (125 mL/Hr) IV Continuous <Continuous>  oxytocin Infusion 333.333 milliUNIT(s)/Min (1000 mL/Hr) IV Continuous <Continuous>    MEDICATIONS  (PRN):

## 2022-08-09 NOTE — OB RN PATIENT PROFILE - NS_PRENATALLABSOURCEGBS36_OBGYN_ALL_OB
Medical Examination    Subjective     Cody Humphrey is a 82 y o  male who presents today for a  fitness determination physical exam  The patient reports no problems  The following portions of the patient's history were reviewed and updated as appropriate: allergies, current medications, past family history, past medical history, past social history, past surgical history and problem list   Review of Systems  Pertinent items are noted in HPI       Objective     Vision:   Uncorrected Corrected Horizontal Field of Vision   Right Eye 20/20 20/20 90 degrees   Left Eye  20/20 20/20 90 degrees   Both Eyes  38/46 40/90      Applicant can recognize and distinguish among traffic control signals and devices showing standard red, green, and tia colors           Monocular Vision?: No      Hearin Hz 1000 Hz 2000 Hz 4000 Hz   Right Ear  hears 40 dB hears 40 dB hears 40 dB hears 40 dB   Left Ear  hears 40 dB hears 40 dB hears 40 dB hears 40 dB          Wt 88 9 kg (196 lb)     General Appearance:    Alert, cooperative, no distress, appears stated age   Head:    Normocephalic, without obvious abnormality, atraumatic   Eyes:    PERRL, conjunctiva/corneas clear, EOM's intact, fundi     benign, both eyes        Ears:    Normal TM's and external ear canals, both ears   Nose:   Nares normal, septum midline, mucosa normal, no drainage    or sinus tenderness   Throat:   Lips, mucosa, and tongue normal; teeth and gums normal   Neck:   Supple, symmetrical, trachea midline, no adenopathy;        thyroid:  No enlargement/tenderness/nodules; no carotid    bruit or JVD   Back:     Symmetric, no curvature, ROM normal, no CVA tenderness   Lungs:     Clear to auscultation bilaterally, respirations unlabored   Chest wall:    No tenderness or deformity   Heart:    Regular rate and rhythm, S1 and S2 normal, no murmur, rub   or gallop   Abdomen:     Soft, non-tender, bowel sounds active all four quadrants,     no masses, no organomegaly   Genitalia:    Normal male without lesion, discharge or tenderness   Rectal:    Normal tone, normal prostate, no masses or tenderness;    guaiac negative stool   Extremities:   Extremities normal, atraumatic, no cyanosis or edema   Pulses:   2+ and symmetric all extremities   Skin:   Skin color, texture, turgor normal, no rashes or lesions   Lymph nodes:   Cervical, supraclavicular, and axillary nodes normal   Neurologic:   CNII-XII intact  Normal strength, sensation and reflexes       throughout       Labs:  No results found for: MISHA Bee, MARIA ELENAU    Assessment/Plan     Healthy male exam    Meets standards in 52  41;  qualifies for 2 year certificate       Medical examiners certificate completed and printed  Return as needed  Colleen Tillman hard copy, drawn during this pregnancy

## 2022-08-10 ENCOUNTER — TRANSCRIPTION ENCOUNTER (OUTPATIENT)
Age: 37
End: 2022-08-10

## 2022-08-10 LAB
BASOPHILS # BLD AUTO: 0.02 K/UL — SIGNIFICANT CHANGE UP (ref 0–0.2)
BASOPHILS NFR BLD AUTO: 0.1 % — SIGNIFICANT CHANGE UP (ref 0–2)
EOSINOPHIL # BLD AUTO: 0 K/UL — SIGNIFICANT CHANGE UP (ref 0–0.5)
EOSINOPHIL NFR BLD AUTO: 0 % — SIGNIFICANT CHANGE UP (ref 0–6)
GROUP B BETA STREP DNA (PCR): SIGNIFICANT CHANGE UP
GROUP B BETA STREP INTERPRETATION: SIGNIFICANT CHANGE UP
HCT VFR BLD CALC: 32.9 % — LOW (ref 34.5–45)
HGB BLD-MCNC: 10.6 G/DL — LOW (ref 11.5–15.5)
IMM GRANULOCYTES NFR BLD AUTO: 0.8 % — SIGNIFICANT CHANGE UP (ref 0–1.5)
LYMPHOCYTES # BLD AUTO: 1.06 K/UL — SIGNIFICANT CHANGE UP (ref 1–3.3)
LYMPHOCYTES # BLD AUTO: 7.2 % — LOW (ref 13–44)
MCHC RBC-ENTMCNC: 28 PG — SIGNIFICANT CHANGE UP (ref 27–34)
MCHC RBC-ENTMCNC: 32.2 GM/DL — SIGNIFICANT CHANGE UP (ref 32–36)
MCV RBC AUTO: 87 FL — SIGNIFICANT CHANGE UP (ref 80–100)
MONOCYTES # BLD AUTO: 0.77 K/UL — SIGNIFICANT CHANGE UP (ref 0–0.9)
MONOCYTES NFR BLD AUTO: 5.3 % — SIGNIFICANT CHANGE UP (ref 2–14)
NEUTROPHILS # BLD AUTO: 12.69 K/UL — HIGH (ref 1.8–7.4)
NEUTROPHILS NFR BLD AUTO: 86.6 % — HIGH (ref 43–77)
PLATELET # BLD AUTO: 172 K/UL — SIGNIFICANT CHANGE UP (ref 150–400)
RBC # BLD: 3.78 M/UL — LOW (ref 3.8–5.2)
RBC # FLD: 12.8 % — SIGNIFICANT CHANGE UP (ref 10.3–14.5)
SOURCE GROUP B STREP: SIGNIFICANT CHANGE UP
WBC # BLD: 14.66 K/UL — HIGH (ref 3.8–10.5)
WBC # FLD AUTO: 14.66 K/UL — HIGH (ref 3.8–10.5)

## 2022-08-10 RX ORDER — DIPHENHYDRAMINE HCL 50 MG
25 CAPSULE ORAL EVERY 6 HOURS
Refills: 0 | Status: DISCONTINUED | OUTPATIENT
Start: 2022-08-10 | End: 2022-08-13

## 2022-08-10 RX ORDER — IBUPROFEN 200 MG
600 TABLET ORAL EVERY 6 HOURS
Refills: 0 | Status: DISCONTINUED | OUTPATIENT
Start: 2022-08-10 | End: 2022-08-13

## 2022-08-10 RX ADMIN — Medication 30 MILLIGRAM(S): at 04:12

## 2022-08-10 RX ADMIN — Medication 975 MILLIGRAM(S): at 06:06

## 2022-08-10 RX ADMIN — Medication 30 MILLIGRAM(S): at 04:18

## 2022-08-10 RX ADMIN — Medication 975 MILLIGRAM(S): at 15:15

## 2022-08-10 RX ADMIN — Medication 25 MILLIGRAM(S): at 21:24

## 2022-08-10 RX ADMIN — Medication 25 MILLIGRAM(S): at 07:42

## 2022-08-10 RX ADMIN — Medication 975 MILLIGRAM(S): at 21:02

## 2022-08-10 RX ADMIN — Medication 30 MILLIGRAM(S): at 09:22

## 2022-08-10 RX ADMIN — Medication 600 MILLIGRAM(S): at 18:09

## 2022-08-10 RX ADMIN — Medication 975 MILLIGRAM(S): at 00:04

## 2022-08-10 RX ADMIN — ENOXAPARIN SODIUM 40 MILLIGRAM(S): 100 INJECTION SUBCUTANEOUS at 07:43

## 2022-08-10 RX ADMIN — Medication 975 MILLIGRAM(S): at 00:06

## 2022-08-10 RX ADMIN — Medication 975 MILLIGRAM(S): at 05:55

## 2022-08-10 NOTE — DISCHARGE NOTE OB - NSTOBACCONEVERSMOKERY/N_GEN_A
[FreeTextEntry1] : Left flank pain- patient referred for stat CT abdomen/pelvis to assess for renal calculi, c/w Tylenol or Ibuprofen for pain control, maintain water hydration, will check urine culture\par \par Anxiety/Depression- c/w Wellbutrin, Effexor \par \par return or call if symptoms worsen, go to ER if symptoms become severe\par \par Addendum- CT abdomen/pelvis showed 7 mm minimally obstructing left mid ureteral calculus, spoke to patient, advised to see urology No

## 2022-08-10 NOTE — DISCHARGE NOTE OB - CARE PLAN
1 Principal Discharge DX:	 delivery delivered  Assessment and plan of treatment:	Patient should transition to regular activity level. Resume regular diet. Patient should follow up with her OB for a postpartum checkup 1-2 weeks after delivery. Patient should call her doctor sooner if she develops a fever or uncontrolled vaginal bleeding. Please call sooner if there are any other concerns.

## 2022-08-10 NOTE — DISCHARGE NOTE OB - HOSPITAL COURSE
HYACINTH MCKEON is a 37y  now POD#3 s/p repeat  section at 34w1d gestation 2/2 PPROM s/p betamethasone x1, uncomplicated delivery. Baby is in the NICU                          10.6   14.66 )-----------( 172      ( 10 Aug 2022 05:38 )             32.9

## 2022-08-10 NOTE — DISCHARGE NOTE OB - CARE PROVIDER_API CALL
Antony Prince)  Obstetrics and Gynecology  [address] [place] [address]  Phone: [phone]  Fax: [phone]  Follow Up Time:

## 2022-08-10 NOTE — PROGRESS NOTE ADULT - ASSESSMENT
A/P:  HYACINTH MCKEON is a 37y  now POD#1 s/p repeat  section at 34w1d gestation 2/2 PPROM s/p betamethasone x1, uncomplicated delivery. Baby is in the NICU  -Vital signs stable  -Hgb:11.2 -> AM labs pending   -Voiding, not yet tolerating PO  -Advance care as tolerated   -Continue routine postpartum and postoperative care and education  -Healthy male infant, desires circumcision  -DVT ppx: lovenox  -Dispo: Anticipate discharge to home when meeting all milestones and pending attending approval. A/P:  HYACINTH MCKEON is a 37y  now POD#1 s/p repeat  section at 34w1d gestation 2/2 PPROM s/p betamethasone x1, uncomplicated delivery. Baby is in the NICU  -Vital signs stable  -Hgb:11.2 -> AM labs pending   -Voiding, not yet tolerating PO  -Advance care as tolerated   -Continue routine postpartum and postoperative care and education  -Male infant, desires circumcision  -DVT ppx: lovenox  -Dispo: Anticipate discharge to home when meeting all milestones and pending attending approval. A/P:  HYACINTH MCKEON is a 37y  now POD#1 s/p repeat  section at 34w1d gestation 2/2 PPROM s/p betamethasone x1, uncomplicated delivery. Baby is in the NICU  -Vital signs stable  -Hgb:11.2 -> AM labs pending   -Not yet attempting PO, will attempt breakfast  -Fonseca removed, void pending  -Advance care as tolerated   -Continue routine postpartum and postoperative care and education  -Male infant in NICU, desires circumcision  -DVT ppx: lovenox  -Dispo: Anticipate discharge to home when meeting all milestones and pending attending approval.    PGY4 Addendum: Subjective Hx, Physical Exam, & Laboratory results reviewed. I agree with the assessment and plan of care, as discussed above, and have edited as necessary.  IFTIKHAR Odonnell MD

## 2022-08-10 NOTE — PROGRESS NOTE ADULT - SUBJECTIVE AND OBJECTIVE BOX
HYACINTH MCKEON is a 37y  now POD#1 s/p repeat  section at 34w1d gestation 2/2 PPROM s/p betamethasone x1, uncomplicated delivery. Baby is in the NICU    S:    No acute events overnight.   The patient has no complaints.  Pain controlled with current treatment regimen.   She is ambulating without difficulty and has not yet had anything to eat.   - flatus/-BM/- voiding Fonseca was removed. TOV @ 1200  She endorses appropriate lochia, which is decreasing.   She is planning to breastfeed  She denies fevers, chills, nausea and vomiting.   She denies lightheadedness, dizziness, palpitations, chest pain and SOB.     O:    T(C): 36.5 (08-10-22 @ 04:19), Max: 37.2 (22 @ 08:05)  HR: 79 (08-10-22 @ 04:19) (65 - 102)  BP: 102/60 (08-10-22 @ 04:19) (101/65 - 121/74)  RR: 17 (08-10-22 @ 04:19) (16 - 24)  SpO2: 99% (08-10-22 @ 04:19) (96% - 99%)    Gen: NAD, AOx3, resting comfortably on room air  Abdomen:  Soft, non-tender, non-distended,   Incision: Clean/dry/intact with mepliex in place   Uterus:  Fundus firm below umbilicus  VE:  Expected lochia  Ext:  b/l LE non-tender                           11.2   7.17  )-----------( 148      ( 09 Aug 2022 09:39 )             33.4

## 2022-08-10 NOTE — DISCHARGE NOTE OB - PATIENT PORTAL LINK FT
You can access the FollowMyHealth Patient Portal offered by [facility] by registering at the following website: [email or web address]. By joining [employer]’s FollowMyHealth portal, you will also be able to view your health information using other applications (apps) compatible with our system.

## 2022-08-10 NOTE — DISCHARGE NOTE OB - NS MD DC FALL RISK RISK
For information on Fall & Injury Prevention, visit: [email or web address] OR  [email or web address] OR  https://www.cdc.gov/steadi/patient.html

## 2022-08-10 NOTE — PROGRESS NOTE ADULT - SUBJECTIVE AND OBJECTIVE BOX
POD # 1    Patient resting comfortably in NAD  Afebrile VSS   Abdomen  soft  not tender  Incision  Dressing in place   dry   Extrem  No Homans   Lochia  Nl    WBC  14.6  H / H 10.6 / 32.9   Platel.  172   VDRL Neg   O +  HIV Neg  COBID  Neg   Ab +=    Patient s/p R c/s ,, PROM  PTL  Flatus ++  Voiding ++  Contimue post op care

## 2022-08-11 RX ORDER — ACETAMINOPHEN 500 MG
1 TABLET ORAL
Qty: 30 | Refills: 0
Start: 2022-08-11 | End: 2022-08-20

## 2022-08-11 RX ORDER — IBUPROFEN 200 MG
1 TABLET ORAL
Qty: 30 | Refills: 0
Start: 2022-08-11

## 2022-08-11 RX ADMIN — Medication 975 MILLIGRAM(S): at 02:36

## 2022-08-11 RX ADMIN — ENOXAPARIN SODIUM 40 MILLIGRAM(S): 100 INJECTION SUBCUTANEOUS at 11:07

## 2022-08-11 RX ADMIN — Medication 975 MILLIGRAM(S): at 16:20

## 2022-08-11 RX ADMIN — Medication 600 MILLIGRAM(S): at 18:16

## 2022-08-11 RX ADMIN — Medication 975 MILLIGRAM(S): at 15:21

## 2022-08-11 RX ADMIN — Medication 600 MILLIGRAM(S): at 18:47

## 2022-08-11 RX ADMIN — Medication 975 MILLIGRAM(S): at 21:25

## 2022-08-11 RX ADMIN — Medication 600 MILLIGRAM(S): at 12:00

## 2022-08-11 RX ADMIN — Medication 600 MILLIGRAM(S): at 05:48

## 2022-08-11 RX ADMIN — Medication 600 MILLIGRAM(S): at 11:07

## 2022-08-11 RX ADMIN — Medication 600 MILLIGRAM(S): at 00:05

## 2022-08-11 NOTE — PROGRESS NOTE ADULT - SUBJECTIVE AND OBJECTIVE BOX
POD # 2    Patient resting comfortably in NAD  Afebrile VSS  Abdomen  Soft Not tender  Incision  Dressing in place   Extrem  No Homans   Lochia NL    Patient s/p R c/s   Patient stable and doing well

## 2022-08-11 NOTE — PROGRESS NOTE ADULT - SUBJECTIVE AND OBJECTIVE BOX
HYACINTH MCKEON is a 37y  now POD#2 s/p repeat  section at 34w1d gestation 2/2 PPROM s/p betamethasone x1, uncomplicated delivery. Baby is in the NICU    S:    No acute events overnight.   The patient has no complaints.  Pain controlled with current treatment regimen.   She is ambulating and tolerating PO   +flatus/-BM/+ voiding   She endorses appropriate lochia, which is decreasing.   She is planning to breastfeed  She denies fevers, chills, nausea and vomiting.   She denies lightheadedness, dizziness, palpitations, chest pain and SOB.     O:    Vital Signs Last 24 Hrs  T(C): 36.8 (11 Aug 2022 03:00), Max: 36.9 (10 Aug 2022 15:40)  T(F): 98.2 (11 Aug 2022 03:00), Max: 98.4 (10 Aug 2022 15:40)  HR: 81 (11 Aug 2022 03:00) (62 - 81)  BP: 103/58 (11 Aug 2022 03:00) (99/67 - 111/73)  RR: 17 (11 Aug 2022 03:00) (17 - 18)  SpO2: 97% (11 Aug 2022 03:00) (97% - 100%)      Gen: NAD, AOx3, resting comfortably on room air  Abdomen:  Soft, non-tender, non-distended,   Incision: Clean/dry/intact with mepliex in place   Uterus:  Fundus firm below umbilicus  VE:  Expected lochia  Ext:  b/l LE non-tender                                      10.6   14.66 )-----------( 172      ( 10 Aug 2022 05:38 )             32.9                HYACINTH MCKEON is a 37y  now POD#2 s/p repeat  section at 34w1d gestation 2/2 PPROM s/p betamethasone x1, uncomplicated delivery. Baby is in the NICU    S:    No acute events overnight.   The patient has no complaints.  Pain controlled with current treatment regimen.   She is ambulating and tolerating PO   +flatus/-BM/+ voiding   She endorses appropriate lochia, which is decreasing.   She is planning to breastfeed, currently pumping   She denies fevers, chills, nausea and vomiting.   She denies lightheadedness, dizziness, palpitations, chest pain and SOB.     O:    Vital Signs Last 24 Hrs  T(C): 36.8 (11 Aug 2022 03:00), Max: 36.9 (10 Aug 2022 15:40)  T(F): 98.2 (11 Aug 2022 03:00), Max: 98.4 (10 Aug 2022 15:40)  HR: 81 (11 Aug 2022 03:00) (62 - 81)  BP: 103/58 (11 Aug 2022 03:00) (99/67 - 111/73)  RR: 17 (11 Aug 2022 03:00) (17 - 18)  SpO2: 97% (11 Aug 2022 03:00) (97% - 100%)      Gen: NAD, AOx3, resting comfortably on room air  Abdomen:  Soft, non-tender, non-distended,   Incision: Clean/dry/intact with mepliex in place   Uterus:  Fundus firm below umbilicus  VE:  Expected lochia  Ext:  b/l LE non-tender                                      10.6   14.66 )-----------( 172      ( 10 Aug 2022 05:38 )             32.9

## 2022-08-11 NOTE — PROGRESS NOTE ADULT - ASSESSMENT
A/P:  HYACINTH MCKEON is a 37y  now POD#2 s/p repeat  section at 34w1d gestation 2/2 PPROM s/p betamethasone x1, uncomplicated delivery. Baby is in the NICU  -Vital signs stable  -Hgb:11.2 ->10.6   - Voiding and tolerating PO  -Advance care as tolerated   -Continue routine postpartum and postoperative care and education  -Male infant in NICU, desires circumcision  -DVT ppx: lovenox  -Dispo: Anticipate discharge to home when meeting all milestones and pending attending approval.     A/P:  HYACINTH MCKEON is a 37y  now POD#2 s/p repeat  section at 34w1d gestation 2/2 PPROM s/p betamethasone x1, uncomplicated delivery. Baby is in the NICU  -Vital signs stable  -Hgb:11.2 ->10.6   - Voiding and tolerating PO  -Advance care as tolerated   -Continue routine postpartum and postoperative care and education  -Male infant in NICU, desires circumcision  -DVT ppx: lovenox  -Dispo: Anticipate discharge to home when meeting all milestones and pending attending approval.    PGY4 Addendum: Subjective Hx, Physical Exam, & Laboratory results reviewed. I agree with the assessment and plan of care, as discussed above, and have edited as necessary.  IFTIKHAR Odonnell MD

## 2022-08-12 PROCEDURE — 93970 EXTREMITY STUDY: CPT | Mod: 26

## 2022-08-12 RX ADMIN — Medication 975 MILLIGRAM(S): at 21:04

## 2022-08-12 RX ADMIN — Medication 975 MILLIGRAM(S): at 15:49

## 2022-08-12 RX ADMIN — Medication 975 MILLIGRAM(S): at 16:50

## 2022-08-12 RX ADMIN — Medication 975 MILLIGRAM(S): at 10:04

## 2022-08-12 RX ADMIN — Medication 600 MILLIGRAM(S): at 07:13

## 2022-08-12 RX ADMIN — Medication 975 MILLIGRAM(S): at 03:50

## 2022-08-12 RX ADMIN — Medication 600 MILLIGRAM(S): at 06:25

## 2022-08-12 RX ADMIN — Medication 600 MILLIGRAM(S): at 00:01

## 2022-08-12 RX ADMIN — Medication 975 MILLIGRAM(S): at 09:29

## 2022-08-12 RX ADMIN — ENOXAPARIN SODIUM 40 MILLIGRAM(S): 100 INJECTION SUBCUTANEOUS at 09:28

## 2022-08-12 RX ADMIN — Medication 600 MILLIGRAM(S): at 18:34

## 2022-08-12 NOTE — PROGRESS NOTE ADULT - SUBJECTIVE AND OBJECTIVE BOX
HYACINTH MCKEON is a 37y  now POD#3 s/p repeat  section at 34w1d gestation 2/2 PPROM s/p betamethasone x1, uncomplicated delivery. Baby is in the NICU    S:    No acute events overnight.   Reported left sided calf pain O/N that then resolved. Described pain as shooting like pain up leg.  Pain controlled with current treatment regimen.   She is ambulating and tolerating PO   +flatus/-BM/+ voiding   She endorses appropriate lochia, which is decreasing.   She is planning to breastfeed, currently pumping   She denies fevers, chills, nausea and vomiting.   She denies lightheadedness, dizziness, palpitations, chest pain and SOB.     O:    ICU Vital Signs Last 24 Hrs  T(C): 36.9 (11 Aug 2022 15:50), Max: 36.9 (11 Aug 2022 15:50)  T(F): 98.4 (11 Aug 2022 15:50), Max: 98.4 (11 Aug 2022 15:50)  HR: 83 (11 Aug 2022 15:50) (81 - 83)  BP: 111/72 (11 Aug 2022 15:50) (103/58 - 111/72)  RR: 18 (11 Aug 2022 15:50) (17 - 18)  SpO2: 98% (11 Aug 2022 15:50) (97% - 98%)    Gen: NAD, AOx3, resting comfortably on room air  Abdomen:  Soft, non-tender, non-distended, mepilex in place with some blood staining  Uterus:  Fundus firm below umbilicus  VE:  Expected lochia  Ext:  b/l LE non-tender on palpation, +1 pitting edema b/l                                     10.6   14.66 )-----------( 172      ( 10 Aug 2022 05:38 )             32.9

## 2022-08-12 NOTE — PROGRESS NOTE ADULT - SUBJECTIVE AND OBJECTIVE BOX
POD # 3     Patient resting comfortably in NAD  Afebrile VSS  Abdomen  soft  Not tender  Incision  Dressing in place  dry   Extrem  No Homans  Lochia  Nl  Voiding +  Flatus +    Patient stable and doing well   Baby is in NICU  Patient desires to stay until AM

## 2022-08-12 NOTE — PROGRESS NOTE ADULT - ASSESSMENT
A/P:  HYACINTH MCKEON is a 37y  now POD#3 s/p repeat  section at 34w1d gestation 2/2 PPROM s/p betamethasone x1, uncomplicated delivery.    #Routine postpartum care  - Stable, doing well postpartum  - Hgb 11.2>10.6  - Pain: well controlled, c/w current regimen  - GI: c/w regular diet, normal bowel function  - : voiding   - DVT ppx: SCDs, ambulation encouraged, lovenox  - Dispo: for home today or tomorrow depending on patient preference

## 2022-08-12 NOTE — PROGRESS NOTE ADULT - ATTENDING COMMENTS
37y  now POD#1 s/p repeat  section at 34w1d gestation 2/2 PPROM s/p betamethasone x1, uncomplicated delivery. Pain controlled. Ronn diet, + amb, +void, + flatus  VSD  Incision - Dressing c/D/I  POD#1 s/p R c/s doing well   - pain control  - encourage amb  - diet as ronn  - male infant - in NICU - desires circ - to be done when cleared    Caitlyn Puga MD
37y  now stable POD#2 s/p repeat  section at 34w1d, will stay as baby remains in NICU.
37y  now stable POD#2 s/p repeat  section at 34w1d, will stay as baby remains in NICU.

## 2022-08-13 VITALS
DIASTOLIC BLOOD PRESSURE: 75 MMHG | HEART RATE: 54 BPM | TEMPERATURE: 98 F | OXYGEN SATURATION: 98 % | RESPIRATION RATE: 18 BRPM | SYSTOLIC BLOOD PRESSURE: 121 MMHG

## 2022-08-13 PROCEDURE — 86703 HIV-1/HIV-2 1 RESULT ANTBDY: CPT

## 2022-08-13 PROCEDURE — 85025 COMPLETE CBC W/AUTO DIFF WBC: CPT

## 2022-08-13 PROCEDURE — 87653 STREP B DNA AMP PROBE: CPT

## 2022-08-13 PROCEDURE — 86780 TREPONEMA PALLIDUM: CPT

## 2022-08-13 PROCEDURE — 88307 TISSUE EXAM BY PATHOLOGIST: CPT

## 2022-08-13 PROCEDURE — 86901 BLOOD TYPING SEROLOGIC RH(D): CPT

## 2022-08-13 PROCEDURE — 59050 FETAL MONITOR W/REPORT: CPT

## 2022-08-13 PROCEDURE — U0003: CPT

## 2022-08-13 PROCEDURE — U0005: CPT

## 2022-08-13 PROCEDURE — 84112 EVAL AMNIOTIC FLUID PROTEIN: CPT

## 2022-08-13 PROCEDURE — 87389 HIV-1 AG W/HIV-1&-2 AB AG IA: CPT

## 2022-08-13 PROCEDURE — 93970 EXTREMITY STUDY: CPT

## 2022-08-13 PROCEDURE — 36415 COLL VENOUS BLD VENIPUNCTURE: CPT

## 2022-08-13 PROCEDURE — 59025 FETAL NON-STRESS TEST: CPT

## 2022-08-13 PROCEDURE — 86900 BLOOD TYPING SEROLOGIC ABO: CPT

## 2022-08-13 PROCEDURE — 86769 SARS-COV-2 COVID-19 ANTIBODY: CPT

## 2022-08-13 PROCEDURE — 86850 RBC ANTIBODY SCREEN: CPT

## 2022-08-13 PROCEDURE — 83986 ASSAY PH BODY FLUID NOS: CPT

## 2022-08-13 PROCEDURE — G0463: CPT

## 2022-08-13 RX ADMIN — ENOXAPARIN SODIUM 40 MILLIGRAM(S): 100 INJECTION SUBCUTANEOUS at 09:57

## 2022-08-13 RX ADMIN — Medication 600 MILLIGRAM(S): at 06:16

## 2022-08-13 RX ADMIN — Medication 600 MILLIGRAM(S): at 12:09

## 2022-08-13 RX ADMIN — Medication 975 MILLIGRAM(S): at 03:59

## 2022-08-13 RX ADMIN — Medication 975 MILLIGRAM(S): at 09:57

## 2022-08-13 RX ADMIN — Medication 600 MILLIGRAM(S): at 00:11

## 2022-08-13 NOTE — PROGRESS NOTE ADULT - SUBJECTIVE AND OBJECTIVE BOX
HYACINTH MCKEON is a 37y  now POD#3 s/p repeat  section at 34w1d gestation 2/2 PPROM s/p betamethasone x1, uncomplicated delivery. Baby is in the NICU    SUBJECTIVE:  No acute events overnight, patient has no complaints.  Pain is well controlled with current treatment regimen.  + flatus, +voiding, +ambulating, ** tolerating PO.  Appropriate lochia, which is decreasing.   Breastfeeding without difficulty.  Denies fever, chills, nausea, and vomiting.  She denies lightheadedness, dizziness, HA, blurry vision, palpitations, chest pain and SOB.     OBJECTIVE:  Physical exam:  General: AOx3, NAD.  Heart: RRR  Lungs: CTAB  Abdomen: Soft, appropriately tender to palpitation, firm uterine fundus at umbilicus. Dressing removed revealing clean dry and intact incision **  : franklin in place draining clear, yellow urine  Vaginal: minimal blood on pad, no bleeding on palpation of fundus  Ext: No DVT signs, warm extremities.    Vital Signs Last 24 Hrs  T(C): 36.8 (13 Aug 2022 04:04), Max: 36.8 (13 Aug 2022 04:04)  T(F): 98.2 (13 Aug 2022 04:04), Max: 98.2 (13 Aug 2022 04:04)  HR: 54 (13 Aug 2022 04:04) (54 - 71)  BP: 121/75 (13 Aug 2022 04:04) (111/74 - 121/75)  BP(mean): --  RR: 18 (13 Aug 2022 04:04) (18 - 18)  SpO2: 98% (13 Aug 2022 04:04) (98% - 98%)        LABS:      A/P:     # ***    #Routine postpartum care  - Stable, doing well postpartum  - Hgb **  - Pain: well controlled, c/w current regimen  - GI: c/w regular diet, normal bowel function  - : voiding **  - DVT ppx: SCDs, ambulation encouraged, lovenox **  - Dispo: ** HYACINTH MCKEON is a 37y  now POD#3 s/p repeat  section at 34w1d gestation 2/2 PPROM s/p betamethasone x1, uncomplicated delivery. Baby is in the NICU    SUBJECTIVE:  No acute events overnight, patient has no complaints.  Pain is well controlled with current treatment regimen.  + flatus, +voiding, +ambulating, + tolerating PO.  Appropriate lochia, which is decreasing.   Breastfeeding without difficulty.  Denies fever, chills, nausea, and vomiting.  She denies lightheadedness, dizziness, HA, blurry vision, palpitations, chest pain and SOB.     OBJECTIVE:  Physical exam:  General: AOx3, NAD.  Heart: RRR  Lungs: CTAB  Abdomen: Soft, appropriately tender to palpitation, firm uterine fundus at umbilicus. Incision clean dry intact  Vaginal: minimal blood on pad, no bleeding on palpation of fundus  Ext: No DVT signs, warm extremities.    Vital Signs Last 24 Hrs  T(C): 36.8 (13 Aug 2022 04:04), Max: 36.8 (13 Aug 2022 04:04)  T(F): 98.2 (13 Aug 2022 04:04), Max: 98.2 (13 Aug 2022 04:04)  HR: 54 (13 Aug 2022 04:04) (54 - 71)  BP: 121/75 (13 Aug 2022 04:04) (111/74 - 121/75)  RR: 18 (13 Aug 2022 04:04) (18 - 18)  SpO2: 98% (13 Aug 2022 04:04) (98% - 98%)          A/P: HYACINTH MCKEON is a 37y  now POD#3 s/p repeat  section at 34w1d gestation 2/2 PPROM s/p betamethasone x1, uncomplicated delivery. Baby is in the NICU    # ***    #Routine postpartum care  - Stable, doing well postpartum  - Hgb **  - Pain: well controlled, c/w current regimen  - GI: c/w regular diet, normal bowel function  - : voiding **  - DVT ppx: SCDs, ambulation encouraged, lovenox **  - Dispo: ** HYACINTH MCKEON is a 37y  now POD#4 s/p repeat  section at 34w1d gestation 2/2 PPROM s/p betamethasone x1, uncomplicated delivery. Baby is in the NICU    SUBJECTIVE:  No acute events overnight, patient has no complaints.  Pain is well controlled with current treatment regimen.  + flatus, +voiding, +ambulating, + tolerating PO.  Appropriate lochia, which is decreasing.   Breastfeeding without difficulty.  Denies fever, chills, nausea, and vomiting.  She denies lightheadedness, dizziness, HA, blurry vision, palpitations, chest pain and SOB.     OBJECTIVE:  Physical exam:  General: AOx3, NAD.  Heart: RRR  Lungs: CTAB  Abdomen: Soft, appropriately tender to palpitation, firm uterine fundus at umbilicus. Incision clean dry intact  Vaginal: minimal blood on pad, no bleeding on palpation of fundus  Ext: No DVT signs, warm extremities.    Vital Signs Last 24 Hrs  T(C): 36.8 (13 Aug 2022 04:04), Max: 36.8 (13 Aug 2022 04:04)  T(F): 98.2 (13 Aug 2022 04:04), Max: 98.2 (13 Aug 2022 04:04)  HR: 54 (13 Aug 2022 04:04) (54 - 71)  BP: 121/75 (13 Aug 2022 04:04) (111/74 - 121/75)  RR: 18 (13 Aug 2022 04:04) (18 - 18)  SpO2: 98% (13 Aug 2022 04:04) (98% - 98%)

## 2022-08-13 NOTE — PROGRESS NOTE ADULT - SUBJECTIVE AND OBJECTIVE BOX
POD # 4    patient resting comfortably in NAD  Afebrile VSS  Abdomen  Soft Not tender  Incision  Dressing in place  Dry    Patient s/p R c/s   Patient stable and doing well   DC to home F/U office 2 days

## 2022-08-13 NOTE — PROGRESS NOTE ADULT - ASSESSMENT
A/P: HYACINTH MCKEON is a 37y  now POD#3 s/p repeat  section at 34w1d gestation 2/2 PPROM s/p betamethasone x1, uncomplicated delivery. Baby is in the NICU    #Routine postpartum care  - Stable, doing well postpartum  - Hgb 11.2>10.6  - Pain: well controlled, c/w current regimen  - GI: c/w regular diet, normal bowel function  - : voiding   - DVT ppx: SCDs, ambulation encouraged, lovenox  - Baby boy in NICU, circ desired, breastfeeding  - Dispo: for home today or tomorrow depending on patient preference A/P: HYACINTH MCKEON is a 37y  now POD#4 s/p repeat  section at 34w1d gestation 2/2 PPROM s/p betamethasone x1, uncomplicated delivery. Baby is in the NICU    #Routine postpartum care  - Stable, doing well postpartum  - Hgb 11.2>10.6  - Pain: well controlled, c/w current regimen  - GI: c/w regular diet, normal bowel function  - : voiding   - DVT ppx: SCDs, ambulation encouraged, lovenox  - Baby boy in NICU, circ desired, breastfeeding  - Dispo: for home today or tomorrow depending on patient preference A/P: HYACINTH MCKEON is a 37y  now POD#4 s/p repeat  section at 34w1d gestation 2/2 PPROM s/p betamethasone x1, uncomplicated delivery. Baby is in the NICU    #Routine postpartum care  - Stable, doing well postpartum  - Hgb 11.2>10.6  - Pain: well controlled, c/w current regimen  - GI: c/w regular diet, normal bowel function  - : voiding   - DVT ppx: SCDs, ambulation encouraged, lovenox  - Baby boy in NICU, circ desired, breastfeeding  - Dispo: Plan for discharge today pending attending assessment

## 2022-08-16 ENCOUNTER — APPOINTMENT (OUTPATIENT)
Dept: OBGYN | Facility: CLINIC | Age: 37
End: 2022-08-16

## 2022-08-16 VITALS
WEIGHT: 185 LBS | HEART RATE: 46 BPM | DIASTOLIC BLOOD PRESSURE: 82 MMHG | SYSTOLIC BLOOD PRESSURE: 129 MMHG | BODY MASS INDEX: 32.77 KG/M2

## 2022-08-16 PROBLEM — Z78.9 OTHER SPECIFIED HEALTH STATUS: Chronic | Status: ACTIVE | Noted: 2022-08-09

## 2022-08-16 PROCEDURE — 0503F POSTPARTUM CARE VISIT: CPT

## 2022-08-16 NOTE — HISTORY OF PRESENT ILLNESS
[Delivery Date: ___] : on [unfilled] [Repeat C/S] : delivered by  section (repeat) [Male] : Delivery History: baby boy [BreastFeeding Problems] : breastfeeding problems [Complications:___] : no complications [Wt. ___] : weighing [unfilled] [Breastfeeding] : currently nursing [None] : No associated symptoms are reported [Clean/Dry/Intact] : clean, dry and intact [Healed] : healed [Cervix Sample Taken] : cervical sample not taken for a Pap smear [Not Done] : Examination of breasts not done [Doing Well] : is doing well [No Sign of Infection] : is showing no signs of infection [de-identified] : Mepilex dressing removed

## 2022-08-17 LAB — SURGICAL PATHOLOGY STUDY: SIGNIFICANT CHANGE UP

## 2022-08-25 ENCOUNTER — APPOINTMENT (OUTPATIENT)
Dept: ANTEPARTUM | Facility: CLINIC | Age: 37
End: 2022-08-25

## 2022-08-30 ENCOUNTER — APPOINTMENT (OUTPATIENT)
Dept: OBGYN | Facility: CLINIC | Age: 37
End: 2022-08-30

## 2022-08-30 VITALS
WEIGHT: 179 LBS | HEART RATE: 74 BPM | BODY MASS INDEX: 31.71 KG/M2 | DIASTOLIC BLOOD PRESSURE: 77 MMHG | SYSTOLIC BLOOD PRESSURE: 123 MMHG

## 2022-08-30 PROCEDURE — 0503F POSTPARTUM CARE VISIT: CPT

## 2022-08-30 NOTE — HISTORY OF PRESENT ILLNESS
[Delivery Date: ___] : on [unfilled] [Breastfeeding] : currently nursing [Complications:___] : no complications [Repeat C/S] : delivered by  section (repeat) [Male] : Delivery History: baby boy [Wt. ___] : weighing [unfilled] [Clean/Dry/Intact] : clean, dry and intact [Erythema] : not erythematous [Swelling] : not swollen [Dehiscence] : not dehisced [Healed] : healed [None] : no vaginal bleeding [Cervix Sample Taken] : cervical sample not taken for a Pap smear [Not Done] : Examination of breasts not done [Doing Well] : is doing well [Excellent Pain Control] : has excellent pain control [de-identified] : benign [FreeTextEntry1] : Patient status post repeat  section on \par Incision healed well\par Patient is breast-feeding appropriately\par Patient denies any problems with voiding passing flatus or bowel movements\par Follow-up 4 weeks

## 2022-09-01 ENCOUNTER — APPOINTMENT (OUTPATIENT)
Dept: ANTEPARTUM | Facility: CLINIC | Age: 37
End: 2022-09-01

## 2022-09-08 ENCOUNTER — APPOINTMENT (OUTPATIENT)
Dept: ANTEPARTUM | Facility: CLINIC | Age: 37
End: 2022-09-08

## 2022-09-15 ENCOUNTER — APPOINTMENT (OUTPATIENT)
Dept: ANTEPARTUM | Facility: CLINIC | Age: 37
End: 2022-09-15

## 2022-09-27 ENCOUNTER — APPOINTMENT (OUTPATIENT)
Dept: OBGYN | Facility: CLINIC | Age: 37
End: 2022-09-27

## 2022-09-27 VITALS
SYSTOLIC BLOOD PRESSURE: 115 MMHG | BODY MASS INDEX: 31.18 KG/M2 | DIASTOLIC BLOOD PRESSURE: 76 MMHG | HEART RATE: 77 BPM | WEIGHT: 176 LBS

## 2022-09-27 PROCEDURE — 0503F POSTPARTUM CARE VISIT: CPT

## 2022-09-27 NOTE — HISTORY OF PRESENT ILLNESS
[Delivery Date: ___] : on [unfilled] [Repeat C/S] : delivered by  section (repeat) [Male] : Delivery History: baby boy [Breastfeeding] : currently nursing [Postpartum Follow Up] : postpartum follow up [Complications:___] : no complications [Wt. ___] : weighing [unfilled] [NICU: ___] : NICU: [unfilled] [Clean/Dry/Intact] : clean, dry and intact [Erythema] : not erythematous [Swelling] : not swollen [Dehiscence] : not dehisced [Healed] : healed [Back to Normal] : is still enlarged [None] : no vaginal bleeding [Healing Well] : is not healing well [Cervix Sample Taken] : cervical sample not taken for a Pap smear [Not Done] : Examination of breasts not done [Doing Well] : is doing well [No Sign of Infection] : is showing no signs of infection [Excellent Pain Control] : has excellent pain control [de-identified] : none [de-identified] : benign [de-identified] : benign [FreeTextEntry1] : Patient status post repeat  section 2022 at 34 weeks and 2 days for premature rupture membranes  labor history of present x2 and findings are consistent with a viable male infant Apgars 8 and 9 weighing 217 0 g this 4+3 ounces there was evidence of a nuchal cord x1 and also a true knot x2\par Patient has healed well,,, flatus positive, voiding positive, bowel movements positive.\par Patient is still breast-feeding\par Patient does not desire any contraception at this point\par Follow-up 6 months for full exam and Pap smear or prior to that as needed

## 2022-11-15 ENCOUNTER — NON-APPOINTMENT (OUTPATIENT)
Age: 37
End: 2022-11-15

## 2023-03-03 NOTE — OB RN DELIVERY SUMMARY - NSDELAYEDCLAMPA_OBGYN_ALL_OB
Topical Antifungals Recommendations: ketoconazole shampoo
Detail Level: Detailed
Topical Antibiotics Recommendations: clindamycin stop \\nbegin gentamicin
Topical Antifungal Recommendations: miconazole OTC cream BID
Shampoo Recommendations: selenium sulfide shampoo twice a week \\nother days can wash area with benzoyl peroxide
Detail Level: Simple
Patient Specific Counseling (Will Not Stick From Patient To Patient): \\n
Yes

## 2023-03-28 ENCOUNTER — APPOINTMENT (OUTPATIENT)
Dept: OBGYN | Facility: CLINIC | Age: 38
End: 2023-03-28

## 2023-03-28 DIAGNOSIS — Z11.51 ENCOUNTER FOR SCREENING FOR HUMAN PAPILLOMAVIRUS (HPV): ICD-10-CM

## 2023-03-28 DIAGNOSIS — Z11.3 ENCOUNTER FOR SCREENING FOR INFECTIONS WITH A PREDOMINANTLY SEXUAL MODE OF TRANSMISSION: ICD-10-CM

## 2023-03-28 DIAGNOSIS — Z01.419 ENCOUNTER FOR GYNECOLOGICAL EXAMINATION (GENERAL) (ROUTINE) W/OUT ABNORMAL FINDINGS: ICD-10-CM

## 2023-12-31 PROBLEM — Z11.3 ENCOUNTER FOR SCREENING EXAMINATION FOR SEXUALLY TRANSMITTED DISEASE: Status: RESOLVED | Noted: 2023-03-28 | Resolved: 2023-04-11

## 2024-11-04 ENCOUNTER — NON-APPOINTMENT (OUTPATIENT)
Age: 39
End: 2024-11-04

## 2024-11-21 ENCOUNTER — NON-APPOINTMENT (OUTPATIENT)
Age: 39
End: 2024-11-21
